# Patient Record
Sex: MALE | Race: WHITE | NOT HISPANIC OR LATINO | URBAN - METROPOLITAN AREA
[De-identification: names, ages, dates, MRNs, and addresses within clinical notes are randomized per-mention and may not be internally consistent; named-entity substitution may affect disease eponyms.]

---

## 2022-06-27 ENCOUNTER — OFFICE (OUTPATIENT)
Dept: URBAN - METROPOLITAN AREA CLINIC 105 | Facility: CLINIC | Age: 37
End: 2022-06-27
Payer: COMMERCIAL

## 2022-06-27 VITALS
HEART RATE: 80 BPM | DIASTOLIC BLOOD PRESSURE: 84 MMHG | SYSTOLIC BLOOD PRESSURE: 124 MMHG | HEIGHT: 70 IN | OXYGEN SATURATION: 98 % | WEIGHT: 174 LBS

## 2022-06-27 DIAGNOSIS — K64.9 UNSPECIFIED HEMORRHOIDS: ICD-10-CM

## 2022-06-27 DIAGNOSIS — Z72.0 TOBACCO USE: ICD-10-CM

## 2022-06-27 DIAGNOSIS — K92.1 MELENA: ICD-10-CM

## 2022-06-27 DIAGNOSIS — Z86.010 PERSONAL HISTORY OF COLONIC POLYPS: ICD-10-CM

## 2022-06-27 DIAGNOSIS — Z80.0 FAMILY HISTORY OF MALIGNANT NEOPLASM OF DIGESTIVE ORGANS: ICD-10-CM

## 2022-06-27 PROCEDURE — 99204 OFFICE O/P NEW MOD 45 MIN: CPT

## 2022-06-27 RX ORDER — SODIUM PICOSULFATE, MAGNESIUM OXIDE, AND ANHYDROUS CITRIC ACID 10; 3.5; 12 MG/160ML; G/160ML; G/160ML
LIQUID ORAL
Qty: 1 | Refills: 0 | Status: ACTIVE
Start: 2022-06-27

## 2022-06-27 NOTE — SERVICENOTES
-ordered colonoscopy
-continue topical ointment
-try fiber supplement twice daily
-increase fiber in your diet to 25-30 grams daily
-try sitz baths

## 2022-06-27 NOTE — SERVICEHPINOTES
Jaun Soto   is seen for an initial visit today.  Patient presents for evaluation of family history of colon cancer, hemorrhoids and rectal bleeding.  He reportedly a colonoscopy in 2012 with polyps and a colonoscopy in 2017 that was normal (reports not available for review).  He reports having blood in stool on a couple of occasions, last in 11/2021.  He has tried suppositories and external creams for his hemorrhoids.  He was referred to Colorectal surgery -Dr. Beatty but did not go to this appointment.  He does not take a fiber supplement.  He tries to eat a healthy diet with lots of vegetables.  His mother was diagnosed with colon cancer age 42. He denies black, tarry stool.

## 2022-08-08 ENCOUNTER — OFFICE (OUTPATIENT)
Dept: URBAN - METROPOLITAN AREA PATHOLOGY 24 | Facility: PATHOLOGY | Age: 37
End: 2022-08-08

## 2022-08-08 ENCOUNTER — AMBULATORY SURGICAL CENTER (OUTPATIENT)
Dept: URBAN - METROPOLITAN AREA SURGERY 26 | Facility: SURGERY | Age: 37
End: 2022-08-08

## 2022-08-08 DIAGNOSIS — D12.2 BENIGN NEOPLASM OF ASCENDING COLON: ICD-10-CM

## 2022-08-08 DIAGNOSIS — Z86.010 PERSONAL HISTORY OF COLONIC POLYPS: ICD-10-CM

## 2022-08-08 DIAGNOSIS — K62.5 HEMORRHAGE OF ANUS AND RECTUM: ICD-10-CM

## 2022-08-08 PROCEDURE — 45385 COLONOSCOPY W/LESION REMOVAL: CPT

## 2022-08-08 PROCEDURE — 88305 TISSUE EXAM BY PATHOLOGIST: CPT | Performed by: PATHOLOGY
